# Patient Record
Sex: FEMALE | Race: WHITE | NOT HISPANIC OR LATINO | URBAN - METROPOLITAN AREA
[De-identification: names, ages, dates, MRNs, and addresses within clinical notes are randomized per-mention and may not be internally consistent; named-entity substitution may affect disease eponyms.]

---

## 2020-07-25 ENCOUNTER — TELEPHONE ENCOUNTER (OUTPATIENT)
Dept: URBAN - METROPOLITAN AREA CLINIC 13 | Facility: CLINIC | Age: 45
End: 2020-07-25

## 2020-07-25 RX ORDER — VENLAFAXINE HYDROCHLORIDE 75 MG/1
TAKE 1 TABLET DAILY TABLET ORAL
Refills: 0 | OUTPATIENT
End: 2017-10-03

## 2020-07-25 RX ORDER — IBUPROFEN 800 MG/1
TAKE 1 TABLET 3 TIMES DAILY WITH FOOD AS NEEDED TABLET ORAL
Refills: 0 | OUTPATIENT
End: 2017-10-03

## 2020-07-25 RX ORDER — LINACLOTIDE 290 UG/1
TAKE 1 CAPSULE BY MOUTH EVERY MORNING 30 MINUTES BEFORE BREAKFAST CAPSULE, GELATIN COATED ORAL
Qty: 90 | Refills: 3 | OUTPATIENT
Start: 2017-10-18 | End: 2018-02-18

## 2020-07-25 RX ORDER — RABEPRAZOLE SODIUM 20 MG/1
TAKE 1 TABLET DAILY TABLET, DELAYED RELEASE ORAL
Qty: 90 | Refills: 3 | OUTPATIENT
End: 2017-10-03

## 2020-07-25 RX ORDER — RABEPRAZOLE SODIUM 20 MG/1
TABLET, DELAYED RELEASE ORAL
Refills: 0 | OUTPATIENT
End: 2018-02-27

## 2020-07-25 RX ORDER — CLONAZEPAM 1 MG/1
TAKE 1 TABLET 3 TIMES DAILY AS NEEDED TABLET ORAL
Qty: 60 | Refills: 0 | OUTPATIENT
End: 2017-10-18

## 2020-07-26 ENCOUNTER — TELEPHONE ENCOUNTER (OUTPATIENT)
Dept: URBAN - METROPOLITAN AREA CLINIC 13 | Facility: CLINIC | Age: 45
End: 2020-07-26

## 2020-07-26 RX ORDER — LUBIPROSTONE 8 UG/1
TAKE 1 CAPSULE TWICE DAILY CAPSULE, GELATIN COATED ORAL
Qty: 60 | Refills: 6 | Status: ACTIVE | COMMUNITY
Start: 2018-02-18

## 2020-07-26 RX ORDER — CLONAZEPAM 0.5 MG/1
PLACE 1 TABLET ON TONGUE AND ALLOW TO DISSOLVE 3 TIMES DAILY AS NEEDED TABLET, ORALLY DISINTEGRATING ORAL
Qty: 90 | Refills: 0 | Status: ACTIVE | COMMUNITY
Start: 2017-10-18

## 2020-07-26 RX ORDER — ROSUVASTATIN CALCIUM 10 MG
TAKE 1 TABLET DAILY TABLET ORAL
Refills: 0 | Status: ACTIVE | COMMUNITY

## 2020-07-26 RX ORDER — DICYCLOMINE HYDROCHLORIDE 10 MG/1
TAKE 1 CAPSULE 3 TIMES DAILY CAPSULE ORAL
Qty: 90 | Refills: 3 | Status: ACTIVE | COMMUNITY
Start: 2017-10-18

## 2020-07-26 RX ORDER — SUMATRIPTAN SUCCINATE 50 MG/1
TAKE 1 TABLET FOR MIGRAINE RELIEF. MAY REPEAT EVERY 2 HOURS. MAX 200MG/DAY TABLET, FILM COATED ORAL
Qty: 30 | Refills: 1 | Status: ACTIVE | COMMUNITY
Start: 2017-10-18

## 2022-02-23 ENCOUNTER — CLAIMS CREATED FROM THE CLAIM WINDOW (OUTPATIENT)
Dept: URBAN - METROPOLITAN AREA CLINIC 107 | Facility: CLINIC | Age: 47
End: 2022-02-23
Payer: COMMERCIAL

## 2022-02-23 ENCOUNTER — TELEPHONE ENCOUNTER (OUTPATIENT)
Dept: URBAN - METROPOLITAN AREA CLINIC 107 | Facility: CLINIC | Age: 47
End: 2022-02-23

## 2022-02-23 ENCOUNTER — WEB ENCOUNTER (OUTPATIENT)
Dept: URBAN - METROPOLITAN AREA CLINIC 107 | Facility: CLINIC | Age: 47
End: 2022-02-23

## 2022-02-23 VITALS
TEMPERATURE: 98.1 F | BODY MASS INDEX: 29.45 KG/M2 | DIASTOLIC BLOOD PRESSURE: 85 MMHG | HEIGHT: 61 IN | SYSTOLIC BLOOD PRESSURE: 135 MMHG | RESPIRATION RATE: 18 BRPM | WEIGHT: 156 LBS | HEART RATE: 63 BPM

## 2022-02-23 DIAGNOSIS — R10.12 LEFT UPPER QUADRANT ABDOMINAL PAIN: ICD-10-CM

## 2022-02-23 DIAGNOSIS — R10.32 LEFT LOWER QUADRANT ABDOMINAL PAIN: ICD-10-CM

## 2022-02-23 DIAGNOSIS — Z12.11 SCREENING FOR COLON CANCER: ICD-10-CM

## 2022-02-23 DIAGNOSIS — K59.09 CHRONIC CONSTIPATION: ICD-10-CM

## 2022-02-23 DIAGNOSIS — Z80.0 FAMILY HISTORY OF COLON CANCER IN FATHER: ICD-10-CM

## 2022-02-23 PROCEDURE — 99204 OFFICE O/P NEW MOD 45 MIN: CPT | Performed by: NURSE PRACTITIONER

## 2022-02-23 RX ORDER — CLONAZEPAM 0.5 MG/1
PLACE 1 TABLET ON TONGUE AND ALLOW TO DISSOLVE 3 TIMES DAILY AS NEEDED TABLET, ORALLY DISINTEGRATING ORAL
Qty: 90 | Refills: 0 | Status: ACTIVE | COMMUNITY
Start: 2017-10-18

## 2022-02-23 RX ORDER — LUBIPROSTONE 8 UG/1
TAKE 1 CAPSULE TWICE DAILY CAPSULE, GELATIN COATED ORAL
Qty: 60 | Refills: 6 | Status: DISCONTINUED | COMMUNITY
Start: 2018-02-18

## 2022-02-23 RX ORDER — QUETIAPINE 25 MG/1
1 TABLET AT BEDTIME TABLET, FILM COATED ORAL ONCE A DAY
Status: ACTIVE | COMMUNITY

## 2022-02-23 RX ORDER — LEVOTHYROXINE SODIUM 88 UG/1
1 TABLET IN THE MORNING ON AN EMPTY STOMACH TABLET ORAL ONCE A DAY
Status: ACTIVE | COMMUNITY

## 2022-02-23 RX ORDER — SUMATRIPTAN SUCCINATE 50 MG/1
TAKE 1 TABLET FOR MIGRAINE RELIEF. MAY REPEAT EVERY 2 HOURS. MAX 200MG/DAY TABLET, FILM COATED ORAL
Qty: 30 | Refills: 1 | Status: DISCONTINUED | COMMUNITY
Start: 2017-10-18

## 2022-02-23 RX ORDER — DICYCLOMINE HYDROCHLORIDE 10 MG/1
TAKE 1 CAPSULE 3 TIMES DAILY CAPSULE ORAL
Qty: 90 | Refills: 3 | Status: DISCONTINUED | COMMUNITY
Start: 2017-10-18

## 2022-02-23 RX ORDER — POLYETHYLENE GLYCOL 3350, SODIUM CHLORIDE, SODIUM BICARBONATE AND POTASSIUM CHLORIDE 420G
AS DIRECTED KIT ORAL ONCE
Qty: 420 GRAM | Refills: 0 | OUTPATIENT
Start: 2022-02-23 | End: 2022-02-24

## 2022-02-23 RX ORDER — LINACLOTIDE 145 UG/1
1 CAPSULE AT LEAST 30 MINUTES BEFORE THE FIRST MEAL OF THE DAY ON AN EMPTY STOMACH CAPSULE, GELATIN COATED ORAL ONCE A DAY
Status: ACTIVE | COMMUNITY

## 2022-02-23 RX ORDER — DICYCLOMINE HYDROCHLORIDE 10 MG/1
1 CAPSULE CAPSULE ORAL
Qty: 60 | Refills: 1 | OUTPATIENT
Start: 2022-02-23 | End: 2022-04-24

## 2022-02-23 RX ORDER — ROSUVASTATIN CALCIUM 10 MG
TAKE 1 TABLET DAILY TABLET ORAL
Refills: 0 | Status: DISCONTINUED | COMMUNITY

## 2022-02-23 NOTE — HPI-OTHER HISTORIES
Anorectal manometry 10/18/17: Type II pelvic floor dyssynergia 10/06/17 MR defecography: Mild rectocele, moderate dyfunction only able to expel about 25% of the gel. 01/06/16 colonoscopy (performed in [place]): Sparse diverticuli throughout the sigmoid and descending colon. Moderate size internal hemorrhoids. Otherwise normal. Bowel preparation was performed with lactulose. 10/2/9/16 EGD (performed by Dr. Eaton): Regular Z line, normal esophagus, mild non-erosive gastritis, normal duodenum. 11/30/17 CT scan of abdomen and pelvis with contrast: No acute abnormalities. Physiologic changes of the ovaries. Prior hysterectomy.

## 2022-03-24 ENCOUNTER — OFFICE VISIT (OUTPATIENT)
Dept: URBAN - METROPOLITAN AREA SURGERY CENTER 25 | Facility: SURGERY CENTER | Age: 47
End: 2022-03-24

## 2022-04-13 ENCOUNTER — OFFICE VISIT (OUTPATIENT)
Dept: URBAN - METROPOLITAN AREA CLINIC 107 | Facility: CLINIC | Age: 47
End: 2022-04-13

## 2022-04-25 ENCOUNTER — OFFICE VISIT (OUTPATIENT)
Dept: URBAN - METROPOLITAN AREA SURGERY CENTER 25 | Facility: SURGERY CENTER | Age: 47
End: 2022-04-25

## 2022-06-23 ENCOUNTER — OFFICE VISIT (OUTPATIENT)
Dept: URBAN - METROPOLITAN AREA CLINIC 113 | Facility: CLINIC | Age: 47
End: 2022-06-23

## 2022-06-23 RX ORDER — QUETIAPINE 25 MG/1
1 TABLET AT BEDTIME TABLET, FILM COATED ORAL ONCE A DAY
Status: ACTIVE | COMMUNITY

## 2022-06-23 RX ORDER — CLONAZEPAM 0.5 MG/1
PLACE 1 TABLET ON TONGUE AND ALLOW TO DISSOLVE 3 TIMES DAILY AS NEEDED TABLET, ORALLY DISINTEGRATING ORAL
Qty: 90 | Refills: 0 | Status: ACTIVE | COMMUNITY
Start: 2017-10-18

## 2022-06-23 RX ORDER — LEVOTHYROXINE SODIUM 88 UG/1
1 TABLET IN THE MORNING ON AN EMPTY STOMACH TABLET ORAL ONCE A DAY
Status: ACTIVE | COMMUNITY

## 2022-06-23 RX ORDER — LINACLOTIDE 145 UG/1
1 CAPSULE AT LEAST 30 MINUTES BEFORE THE FIRST MEAL OF THE DAY ON AN EMPTY STOMACH CAPSULE, GELATIN COATED ORAL ONCE A DAY
Status: ACTIVE | COMMUNITY

## 2023-02-20 ENCOUNTER — OFFICE VISIT (OUTPATIENT)
Dept: URBAN - METROPOLITAN AREA CLINIC 113 | Facility: CLINIC | Age: 48
End: 2023-02-20

## 2023-03-09 ENCOUNTER — OFFICE VISIT (OUTPATIENT)
Dept: URBAN - METROPOLITAN AREA CLINIC 113 | Facility: CLINIC | Age: 48
End: 2023-03-09
Payer: COMMERCIAL

## 2023-03-09 ENCOUNTER — DASHBOARD ENCOUNTERS (OUTPATIENT)
Age: 48
End: 2023-03-09

## 2023-03-09 VITALS
HEART RATE: 78 BPM | TEMPERATURE: 97.5 F | SYSTOLIC BLOOD PRESSURE: 126 MMHG | DIASTOLIC BLOOD PRESSURE: 81 MMHG | RESPIRATION RATE: 18 BRPM | HEIGHT: 61 IN | WEIGHT: 135 LBS | BODY MASS INDEX: 25.49 KG/M2

## 2023-03-09 DIAGNOSIS — Z80.0 FAMILY HISTORY OF COLON CANCER IN FATHER: ICD-10-CM

## 2023-03-09 DIAGNOSIS — K59.01 SLOW TRANSIT CONSTIPATION: ICD-10-CM

## 2023-03-09 PROBLEM — 35298007 SLOW TRANSIT CONSTIPATION: Status: ACTIVE | Noted: 2023-03-09

## 2023-03-09 PROCEDURE — 99213 OFFICE O/P EST LOW 20 MIN: CPT | Performed by: NURSE PRACTITIONER

## 2023-03-09 RX ORDER — LINACLOTIDE 145 UG/1
1 CAPSULE AT LEAST 30 MINUTES BEFORE THE FIRST MEAL OF THE DAY ON AN EMPTY STOMACH CAPSULE, GELATIN COATED ORAL ONCE A DAY
Qty: 90 | Refills: 3 | OUTPATIENT
Start: 2023-03-09 | End: 2024-03-03

## 2023-03-09 RX ORDER — QUETIAPINE 25 MG/1
1 TABLET AT BEDTIME TABLET, FILM COATED ORAL ONCE A DAY
Status: ON HOLD | COMMUNITY

## 2023-03-09 RX ORDER — CLONAZEPAM 0.5 MG/1
PLACE 1 TABLET ON TONGUE AND ALLOW TO DISSOLVE 3 TIMES DAILY AS NEEDED TABLET, ORALLY DISINTEGRATING ORAL
Qty: 90 | Refills: 0 | Status: ACTIVE | COMMUNITY
Start: 2017-10-18

## 2023-03-09 RX ORDER — LEVOTHYROXINE SODIUM 88 UG/1
1 TABLET IN THE MORNING ON AN EMPTY STOMACH TABLET ORAL ONCE A DAY
Status: ACTIVE | COMMUNITY

## 2023-03-09 RX ORDER — LINACLOTIDE 145 UG/1
1 CAPSULE AT LEAST 30 MINUTES BEFORE THE FIRST MEAL OF THE DAY ON AN EMPTY STOMACH CAPSULE, GELATIN COATED ORAL ONCE A DAY
Status: ACTIVE | COMMUNITY

## 2023-03-09 NOTE — HPI-TODAY'S VISIT:
47-year-old female with a history of fibromyalgia, asthma, thyroid cancer s/p thyroidectomy (2017), GERD, constipation, pelvic floor dyssynergia, paternal history of colon cancer, presenting for follow-up. She was previously managed in our office by Dr. Clarke. She was seen in the office in February 2022 for evaluation of a recent exacerbation of left-sided abdominal pain suspected to be related to suboptimally managed constipation.  Given associated fever and mild tenderness on exam, a CT of the abdomen and pelvis was planned to exclude diverticulitis.  She was encouraged to increase Linzess 145 mcg to more routine use for management of chronic constipation and dicyclomine was provided to use as needed.  Screening colonoscopy was planned due to her paternal history of colon cancer.  She did not proceed with the procedure. CT of the abdomen and pelvis without contrast 3/2/2022  was unremarkable. Overall, she is doing well from a GI standpoint. She stopped her Linzess for about a month which resulted in constipation and left upper quadrant discomfort just under her rib cage. She resumed the Linzess and this has resolved. She is having a daily nonbloody stool. No further abdominal pain, nausea or vomiting. She anticipated having a repeat colonoscopy performed in [place], but was unable to have the procedure during her travel.

## 2023-05-25 ENCOUNTER — TELEPHONE ENCOUNTER (OUTPATIENT)
Dept: URBAN - METROPOLITAN AREA CLINIC 113 | Facility: CLINIC | Age: 48
End: 2023-05-25

## 2023-05-25 ENCOUNTER — OFFICE VISIT (OUTPATIENT)
Dept: URBAN - METROPOLITAN AREA SURGERY CENTER 25 | Facility: SURGERY CENTER | Age: 48
End: 2023-05-25

## 2023-05-25 RX ORDER — QUETIAPINE 25 MG/1
1 TABLET AT BEDTIME TABLET, FILM COATED ORAL ONCE A DAY
Status: ON HOLD | COMMUNITY

## 2023-05-25 RX ORDER — LINACLOTIDE 145 UG/1
1 CAPSULE AT LEAST 30 MINUTES BEFORE THE FIRST MEAL OF THE DAY ON AN EMPTY STOMACH CAPSULE, GELATIN COATED ORAL ONCE A DAY
Status: ACTIVE | COMMUNITY

## 2023-05-25 RX ORDER — CLONAZEPAM 0.5 MG/1
PLACE 1 TABLET ON TONGUE AND ALLOW TO DISSOLVE 3 TIMES DAILY AS NEEDED TABLET, ORALLY DISINTEGRATING ORAL
Qty: 90 | Refills: 0 | Status: ACTIVE | COMMUNITY
Start: 2017-10-18

## 2023-05-25 RX ORDER — LINACLOTIDE 145 UG/1
1 CAPSULE AT LEAST 30 MINUTES BEFORE THE FIRST MEAL OF THE DAY ON AN EMPTY STOMACH CAPSULE, GELATIN COATED ORAL ONCE A DAY
Qty: 90 | Refills: 3 | Status: ACTIVE | COMMUNITY
Start: 2023-03-09 | End: 2024-03-03

## 2023-05-25 RX ORDER — LEVOTHYROXINE SODIUM 88 UG/1
1 TABLET IN THE MORNING ON AN EMPTY STOMACH TABLET ORAL ONCE A DAY
Status: ACTIVE | COMMUNITY

## 2023-06-19 ENCOUNTER — OFFICE VISIT (OUTPATIENT)
Dept: URBAN - METROPOLITAN AREA CLINIC 113 | Facility: CLINIC | Age: 48
End: 2023-06-19

## 2024-07-23 ENCOUNTER — OFFICE VISIT (OUTPATIENT)
Dept: URBAN - METROPOLITAN AREA CLINIC 113 | Facility: CLINIC | Age: 49
End: 2024-07-23

## 2024-08-19 ENCOUNTER — LAB OUTSIDE AN ENCOUNTER (OUTPATIENT)
Dept: URBAN - METROPOLITAN AREA CLINIC 113 | Facility: CLINIC | Age: 49
End: 2024-08-19

## 2024-08-19 ENCOUNTER — OFFICE VISIT (OUTPATIENT)
Dept: URBAN - METROPOLITAN AREA CLINIC 113 | Facility: CLINIC | Age: 49
End: 2024-08-19
Payer: COMMERCIAL

## 2024-08-19 VITALS
WEIGHT: 142 LBS | BODY MASS INDEX: 26.81 KG/M2 | RESPIRATION RATE: 18 BRPM | HEART RATE: 57 BPM | HEIGHT: 61 IN | TEMPERATURE: 97.1 F | SYSTOLIC BLOOD PRESSURE: 140 MMHG | DIASTOLIC BLOOD PRESSURE: 82 MMHG

## 2024-08-19 DIAGNOSIS — R10.12 LEFT UPPER QUADRANT ABDOMINAL PAIN: ICD-10-CM

## 2024-08-19 DIAGNOSIS — K21.9 GERD WITHOUT ESOPHAGITIS: ICD-10-CM

## 2024-08-19 DIAGNOSIS — K59.01 SLOW TRANSIT CONSTIPATION: ICD-10-CM

## 2024-08-19 PROCEDURE — 99214 OFFICE O/P EST MOD 30 MIN: CPT | Performed by: NURSE PRACTITIONER

## 2024-08-19 RX ORDER — LINACLOTIDE 145 UG/1
1 CAPSULE AT LEAST 30 MINUTES BEFORE THE FIRST MEAL OF THE DAY ON AN EMPTY STOMACH CAPSULE, GELATIN COATED ORAL ONCE A DAY
Status: ON HOLD | COMMUNITY

## 2024-08-19 RX ORDER — LINACLOTIDE 145 UG/1
1 CAPSULE AT LEAST 30 MINUTES BEFORE THE FIRST MEAL OF THE DAY ON AN EMPTY STOMACH CAPSULE, GELATIN COATED ORAL ONCE A DAY
Qty: 90 | Refills: 3 | OUTPATIENT
Start: 2024-08-19 | End: 2025-08-14

## 2024-08-19 RX ORDER — ASPIRIN 81 MG/1
1 TABLET TABLET, COATED ORAL ONCE A DAY
Status: ACTIVE | COMMUNITY

## 2024-08-19 RX ORDER — DICYCLOMINE HYDROCHLORIDE 10 MG/1
1-2 CAPSULE CAPSULE ORAL
Qty: 60 CAPSULES | Refills: 1 | OUTPATIENT
Start: 2024-08-19 | End: 2024-10-18

## 2024-08-19 RX ORDER — PANTOPRAZOLE 40 MG/1
1 TABLET TABLET, DELAYED RELEASE ORAL ONCE A DAY
Qty: 90 TABLET | Refills: 3 | OUTPATIENT
Start: 2024-08-19

## 2024-08-19 RX ORDER — QUETIAPINE 25 MG/1
1 TABLET AT BEDTIME TABLET, FILM COATED ORAL ONCE A DAY
Status: ON HOLD | COMMUNITY

## 2024-08-19 RX ORDER — LEVOTHYROXINE SODIUM 88 UG/1
1 TABLET IN THE MORNING ON AN EMPTY STOMACH TABLET ORAL ONCE A DAY
Status: ACTIVE | COMMUNITY

## 2024-08-19 RX ORDER — CLONAZEPAM 0.5 MG/1
PLACE 1 TABLET ON TONGUE AND ALLOW TO DISSOLVE 3 TIMES DAILY AS NEEDED TABLET, ORALLY DISINTEGRATING ORAL
Qty: 90 | Refills: 0 | Status: ACTIVE | COMMUNITY
Start: 2017-10-18

## 2024-08-19 NOTE — HPI-TODAY'S VISIT:
49-year-old female with a history of fibromyalgia, asthma, thyroid cancer s/p thyroidectomy (2017), GERD, constipation, pelvic floor dyssynergia, paternal history of colon cancer, presenting for follow-up. She was last seen in the office in March 2023 for follow-up regarding chronic constipation, well-managed with Linzess 145 mcg daily.  A screening colonoscopy was planned due to her paternal history of colon however, she did not proceed with the procedure. She returns today with complaint of an 8-month history of abdominal pain.  She complains of intermittent left-sided abdominal cramping which is worsened following meals and will awaken her at night.  She reports some relief with a bowel movement.  She complains of constipation since running out of her Linzess 3 to 4 months ago.  No blood in the stool.  She also reports increasing reflux symptoms with postprandial indigestion and regurgitation.  She is not on medication for this currently.

## 2024-08-19 NOTE — HPI-OTHER HISTORIES
CT of the abdomen and pelvis without contrast 3/2/2022 was unremarkable. Anorectal manometry 10/18/17: Type II pelvic floor dyssynergia 10/06/17 MR defecography: Mild rectocele, moderate dyfunction only able to expel about 25% of the gel. 01/06/16 colonoscopy (performed in [place]): Sparse diverticuli throughout the sigmoid and descending colon. Moderate size internal hemorrhoids. Otherwise normal. Bowel preparation was performed with lactulose. 10/2/9/16 EGD (performed by Dr. Eaton): Regular Z line, normal esophagus, mild non-erosive gastritis, normal duodenum. 11/30/17 CT scan of abdomen and pelvis with contrast: No acute abnormalities. Physiologic changes of the ovaries. Prior hysterectomy.

## 2024-08-20 LAB
(TTG) AB, IGG: <1
A/G RATIO: 1.5
ABSOLUTE BASOPHILS: 34
ABSOLUTE EOSINOPHILS: 17
ABSOLUTE LYMPHOCYTES: 1707
ABSOLUTE MONOCYTES: 845
ABSOLUTE NEUTROPHILS: (no result)
ALBUMIN: 4.5
ALKALINE PHOSPHATASE: 73
ALT (SGPT): 51
ANTIGLIADIN ABS, IGA: 6.2
AST (SGOT): 19
BASOPHILS: 0.2
BILIRUBIN, TOTAL: 0.5
BUN/CREATININE RATIO: (no result)
BUN: 19
CALCIUM: 9.3
CARBON DIOXIDE, TOTAL: 28
CHLORIDE: 97
CREATININE: 0.76
EGFR: 95
EOSINOPHILS: 0.1
GLIADIN (DEAMIDATED) AB (IGG): <1
GLOBULIN, TOTAL: 3
GLUCOSE: 70
HEMATOCRIT: 49.9
HEMOGLOBIN: 16.8
IMMUNOGLOBULIN A: 193
LIPASE: 20
LYMPHOCYTES: 10.1
MCH: 32.9
MCHC: 33.7
MCV: 97.8
MONOCYTES: 5
MPV: 10.7
NEUTROPHILS: 84.6
PLATELET COUNT: 274
POTASSIUM: 4
PROTEIN, TOTAL: 7.5
RDW: 12.3
RED BLOOD CELL COUNT: 5.1
SODIUM: 137
T-TRANSGLUTAMINASE (TTG) IGA: <1
TSH W/REFLEX TO FT4: 1.59
WHITE BLOOD CELL COUNT: 16.9

## 2024-08-29 ENCOUNTER — TELEPHONE ENCOUNTER (OUTPATIENT)
Dept: URBAN - METROPOLITAN AREA CLINIC 113 | Facility: CLINIC | Age: 49
End: 2024-08-29

## 2024-08-29 ENCOUNTER — OFFICE VISIT (OUTPATIENT)
Dept: URBAN - METROPOLITAN AREA SURGERY CENTER 25 | Facility: SURGERY CENTER | Age: 49
End: 2024-08-29

## 2024-09-30 ENCOUNTER — OFFICE VISIT (OUTPATIENT)
Dept: URBAN - METROPOLITAN AREA CLINIC 113 | Facility: CLINIC | Age: 49
End: 2024-09-30

## 2024-10-31 ENCOUNTER — OFFICE VISIT (OUTPATIENT)
Dept: URBAN - METROPOLITAN AREA CLINIC 113 | Facility: CLINIC | Age: 49
End: 2024-10-31